# Patient Record
(demographics unavailable — no encounter records)

---

## 2022-03-30 DIAGNOSIS — E78.00 HYPERCHOLESTEREMIA: Primary | ICD-10-CM

## 2022-03-30 RX ORDER — SIMVASTATIN 20 MG
20 TABLET ORAL AT BEDTIME
OUTPATIENT
Start: 2022-03-30

## 2022-03-30 RX ORDER — SIMVASTATIN 20 MG
20 TABLET ORAL AT BEDTIME
Qty: 30 TABLET | Refills: 0 | Status: SHIPPED | OUTPATIENT
Start: 2022-03-30

## 2022-03-30 RX ORDER — SIMVASTATIN 20 MG
20 TABLET ORAL AT BEDTIME
COMMUNITY
End: 2022-03-30

## 2023-09-12 NOTE — TELEPHONE ENCOUNTER
CALL the pharmacy and ask if right pt.  IF it is right pt - then who RX med before.   
Dr Reno, I checked with [facility]. That is the correct DO. They said he had never used their pharmacy before. Sorry for the inconvenience.   Caity   
REcheck pt -- I have a Gerardo Dey in my practice. Is this a wrong person/same name issue.   
That's the  the pharmacy sent  
Will allow 30 days - needs to establish care here if plans on getting more Rx. Records of previous care needs to be asked for.    
zocor      Last Written Prescription Date:  Pt has never been seen here before requesting rx from you  Last Fill Quantity: ,   # refills:   Last Office Visit:   Future Office visit:     
pending orders.